# Patient Record
Sex: FEMALE | Race: WHITE | Employment: OTHER | URBAN - METROPOLITAN AREA
[De-identification: names, ages, dates, MRNs, and addresses within clinical notes are randomized per-mention and may not be internally consistent; named-entity substitution may affect disease eponyms.]

---

## 2018-05-24 ENCOUNTER — HOSPITAL ENCOUNTER (OUTPATIENT)
Dept: RADIATION ONCOLOGY | Age: 73
Discharge: HOME | End: 2018-05-24
Payer: MEDICARE

## 2018-05-31 ENCOUNTER — HOSPITAL ENCOUNTER (OUTPATIENT)
Dept: RADIATION ONCOLOGY | Age: 73
Discharge: HOME | End: 2018-05-31
Payer: MEDICARE

## 2018-05-31 VITALS
BODY MASS INDEX: 31.82 KG/M2 | RESPIRATION RATE: 14 BRPM | TEMPERATURE: 97.8 F | WEIGHT: 191 LBS | DIASTOLIC BLOOD PRESSURE: 68 MMHG | SYSTOLIC BLOOD PRESSURE: 122 MMHG | HEIGHT: 65 IN | HEART RATE: 80 BPM

## 2018-05-31 DIAGNOSIS — Z17.0 MALIGNANT NEOPLASM OF UPPER-OUTER QUADRANT OF RIGHT BREAST IN FEMALE, ESTROGEN RECEPTOR POSITIVE (HCC): Primary | ICD-10-CM

## 2018-05-31 DIAGNOSIS — C50.411 MALIGNANT NEOPLASM OF UPPER-OUTER QUADRANT OF RIGHT BREAST IN FEMALE, ESTROGEN RECEPTOR POSITIVE (HCC): Primary | ICD-10-CM

## 2018-05-31 PROCEDURE — 99204 OFFICE O/P NEW MOD 45 MIN: CPT | Performed by: RADIOLOGY

## 2018-05-31 PROCEDURE — 99205 OFFICE O/P NEW HI 60 MIN: CPT

## 2018-05-31 RX ORDER — NICOTINE 10 MG
CARTRIDGE (EA) INHALATION
COMMUNITY
Start: 2018-03-19 | End: 2018-11-21

## 2018-05-31 RX ORDER — OXYCODONE HYDROCHLORIDE 5 MG/1
TABLET ORAL
COMMUNITY
Start: 2018-05-10 | End: 2018-08-17

## 2018-06-01 ENCOUNTER — HOSPITAL ENCOUNTER (OUTPATIENT)
Dept: RADIATION ONCOLOGY | Age: 73
Discharge: HOME | End: 2018-06-01
Payer: MEDICARE

## 2018-06-04 ENCOUNTER — PRE-PROCEDURE TELEPHONE (OUTPATIENT)
Dept: RADIATION ONCOLOGY | Age: 73
End: 2018-06-04

## 2018-06-07 PROCEDURE — 77290 THER RAD SIMULAJ FIELD CPLX: CPT

## 2018-06-07 PROCEDURE — 77334 RADIATION TREATMENT AID(S): CPT

## 2018-06-11 PROCEDURE — 77295 3-D RADIOTHERAPY PLAN: CPT

## 2018-06-11 PROCEDURE — 77334 RADIATION TREATMENT AID(S): CPT

## 2018-06-11 PROCEDURE — 77300 RADIATION THERAPY DOSE PLAN: CPT

## 2018-06-14 ENCOUNTER — HOSPITAL ENCOUNTER (OUTPATIENT)
Dept: RADIATION ONCOLOGY | Age: 73
Discharge: HOME | End: 2018-06-14
Payer: MEDICARE

## 2018-06-14 VITALS
BODY MASS INDEX: 31.53 KG/M2 | DIASTOLIC BLOOD PRESSURE: 63 MMHG | TEMPERATURE: 97.7 F | HEART RATE: 76 BPM | WEIGHT: 189.5 LBS | SYSTOLIC BLOOD PRESSURE: 122 MMHG

## 2018-06-14 DIAGNOSIS — Z17.0 MALIGNANT NEOPLASM OF UPPER-OUTER QUADRANT OF RIGHT BREAST IN FEMALE, ESTROGEN RECEPTOR POSITIVE (HCC): Primary | ICD-10-CM

## 2018-06-14 DIAGNOSIS — C50.411 MALIGNANT NEOPLASM OF UPPER-OUTER QUADRANT OF RIGHT BREAST IN FEMALE, ESTROGEN RECEPTOR POSITIVE (HCC): Primary | ICD-10-CM

## 2018-06-14 PROCEDURE — 77412 RADIATION TX DELIVERY LVL 3: CPT

## 2018-06-14 PROCEDURE — 77417 THER RADIOLOGY PORT IMAGE(S): CPT

## 2018-06-14 PROCEDURE — 99999 PR OFFICE/OUTPT VISIT,PROCEDURE ONLY: CPT | Performed by: RADIOLOGY

## 2018-06-15 PROCEDURE — 77412 RADIATION TX DELIVERY LVL 3: CPT

## 2018-06-18 ENCOUNTER — TELEPHONE (OUTPATIENT)
Dept: RADIATION ONCOLOGY | Age: 73
End: 2018-06-18

## 2018-06-18 PROCEDURE — 77412 RADIATION TX DELIVERY LVL 3: CPT

## 2018-06-19 PROCEDURE — 77412 RADIATION TX DELIVERY LVL 3: CPT

## 2018-06-20 PROCEDURE — 77412 RADIATION TX DELIVERY LVL 3: CPT

## 2018-06-20 PROCEDURE — 77336 RADIATION PHYSICS CONSULT: CPT

## 2018-06-21 ENCOUNTER — HOSPITAL ENCOUNTER (OUTPATIENT)
Dept: RADIATION ONCOLOGY | Age: 73
Discharge: HOME | End: 2018-06-21
Payer: MEDICARE

## 2018-06-21 VITALS — BODY MASS INDEX: 31.45 KG/M2 | WEIGHT: 189 LBS

## 2018-06-21 DIAGNOSIS — Z17.0 MALIGNANT NEOPLASM OF UPPER-OUTER QUADRANT OF RIGHT BREAST IN FEMALE, ESTROGEN RECEPTOR POSITIVE (HCC): Primary | ICD-10-CM

## 2018-06-21 DIAGNOSIS — C50.411 MALIGNANT NEOPLASM OF UPPER-OUTER QUADRANT OF RIGHT BREAST IN FEMALE, ESTROGEN RECEPTOR POSITIVE (HCC): Primary | ICD-10-CM

## 2018-06-21 PROCEDURE — 77412 RADIATION TX DELIVERY LVL 3: CPT

## 2018-06-21 PROCEDURE — 77417 THER RADIOLOGY PORT IMAGE(S): CPT

## 2018-06-21 PROCEDURE — 99999 PR OFFICE/OUTPT VISIT,PROCEDURE ONLY: CPT | Performed by: RADIOLOGY

## 2018-06-22 PROCEDURE — 77412 RADIATION TX DELIVERY LVL 3: CPT

## 2018-06-25 PROCEDURE — 77412 RADIATION TX DELIVERY LVL 3: CPT

## 2018-06-26 PROCEDURE — 77412 RADIATION TX DELIVERY LVL 3: CPT

## 2018-06-27 PROCEDURE — 77412 RADIATION TX DELIVERY LVL 3: CPT

## 2018-06-27 PROCEDURE — 77336 RADIATION PHYSICS CONSULT: CPT

## 2018-06-28 ENCOUNTER — HOSPITAL ENCOUNTER (OUTPATIENT)
Dept: RADIATION ONCOLOGY | Age: 73
Discharge: HOME | End: 2018-06-28
Payer: MEDICARE

## 2018-06-28 VITALS
SYSTOLIC BLOOD PRESSURE: 117 MMHG | HEART RATE: 84 BPM | WEIGHT: 189.2 LBS | BODY MASS INDEX: 31.48 KG/M2 | DIASTOLIC BLOOD PRESSURE: 55 MMHG

## 2018-06-28 DIAGNOSIS — C50.411 MALIGNANT NEOPLASM OF UPPER-OUTER QUADRANT OF RIGHT BREAST IN FEMALE, ESTROGEN RECEPTOR POSITIVE (HCC): Primary | ICD-10-CM

## 2018-06-28 DIAGNOSIS — Z17.0 MALIGNANT NEOPLASM OF UPPER-OUTER QUADRANT OF RIGHT BREAST IN FEMALE, ESTROGEN RECEPTOR POSITIVE (HCC): Primary | ICD-10-CM

## 2018-06-28 PROCEDURE — 77417 THER RADIOLOGY PORT IMAGE(S): CPT

## 2018-06-28 PROCEDURE — 99999 PR OFFICE/OUTPT VISIT,PROCEDURE ONLY: CPT | Performed by: RADIOLOGY

## 2018-06-28 PROCEDURE — 77412 RADIATION TX DELIVERY LVL 3: CPT

## 2018-06-28 RX ORDER — ANASTROZOLE 1 MG/1
TABLET ORAL
COMMUNITY
Start: 2018-06-21

## 2018-06-29 PROCEDURE — 77412 RADIATION TX DELIVERY LVL 3: CPT

## 2018-07-02 ENCOUNTER — HOSPITAL ENCOUNTER (OUTPATIENT)
Dept: RADIATION ONCOLOGY | Age: 73
Discharge: HOME | End: 2018-07-02
Payer: MEDICARE

## 2018-07-02 VITALS
BODY MASS INDEX: 31.38 KG/M2 | SYSTOLIC BLOOD PRESSURE: 108 MMHG | DIASTOLIC BLOOD PRESSURE: 82 MMHG | HEART RATE: 91 BPM | WEIGHT: 188.6 LBS

## 2018-07-02 DIAGNOSIS — C50.411 MALIGNANT NEOPLASM OF UPPER-OUTER QUADRANT OF RIGHT BREAST IN FEMALE, ESTROGEN RECEPTOR POSITIVE (HCC): Primary | ICD-10-CM

## 2018-07-02 DIAGNOSIS — Z17.0 MALIGNANT NEOPLASM OF UPPER-OUTER QUADRANT OF RIGHT BREAST IN FEMALE, ESTROGEN RECEPTOR POSITIVE (HCC): Primary | ICD-10-CM

## 2018-07-02 PROCEDURE — 99999 PR OFFICE/OUTPT VISIT,PROCEDURE ONLY: CPT | Performed by: RADIOLOGY

## 2018-07-02 PROCEDURE — 77412 RADIATION TX DELIVERY LVL 3: CPT

## 2018-07-02 NOTE — PROGRESS NOTES
DEPARTMENT OF RADIATION ONCOLOGY   ON TREATMENT VISIT       7/2/2018      NAME:  Issac Rossi    YOB: 1945      Diagnosis:  1. Malignant neoplasm of upper-outer quadrant of right breast in female, estrogen receptor positive ([address]        SUBJECTIVE:   Kathi Edgar status post  3458 cGy to the right breast.  She is having a little soreness in the axillary area, despite Aquaphor         Physical Examination: Grade 1 reaction right axillary area. No edema or infection      Wt Readings from Last 3 Encounters:   07/02/18 188 lb 9.6 oz (85.5 kg)   06/28/18 189 lb 3.2 oz (85.8 kg)   06/21/18 189 lb (85.7 kg)       Labs:  Lab Results   Component Value Date    WBC 11.6 05/30/2014    RBC 5.30 05/30/2014    HGB 15.3 05/30/2014    HCT 46.8 05/30/2014    MCV 88.3 05/30/2014    MCH 28.9 05/30/2014    MCHC 32.8 05/30/2014    RDW 14.4 05/30/2014     05/30/2014    MPV 9.1 05/30/2014            ASSESSMENT/PLAN: Reassured. She can apply OTC hydrocortisone plus aloe    Continue treatment as planned      Elsa Higuera M.D.   Radiation Oncologist  [place]: [phone]   Daphne Clemons: [phone]: [phone]   Daphne Clemons: [phone])

## 2018-07-03 PROCEDURE — 77412 RADIATION TX DELIVERY LVL 3: CPT

## 2018-07-05 PROCEDURE — 77412 RADIATION TX DELIVERY LVL 3: CPT

## 2018-07-05 PROCEDURE — 77336 RADIATION PHYSICS CONSULT: CPT

## 2018-07-06 PROCEDURE — 77412 RADIATION TX DELIVERY LVL 3: CPT

## 2018-07-06 NOTE — PROGRESS NOTES
Cliff Dempsey  7/6/2018  8:32 AM          Current Outpatient Prescriptions   Medication Sig Dispense Refill    anastrozole (ARIMIDEX) 1 MG tablet       metFORMIN (GLUCOPHAGE) 500 MG tablet       NICOTROL 10 MG inhaler       oxyCODONE (ROXICODONE) 5 MG immediate release tablet       sitaGLIPtan (JANUVIA) 100 MG tablet Take 1 tablet by mouth daily. 30 tablet 2    atorvastatin (LIPITOR) 40 MG tablet Take 40 mg by mouth daily.  Omega-3 Fatty Acids (FISH OIL BURP-LESS) 1000 MG CAPS Take 1,000 mg by mouth. Once daily      LANCETS by Does not apply route. Indications: tests 3x a aday      glucose blood VI test strips (ASCENSIA AUTODISC VI;ONE TOUCH ULTRA TEST VI) strip Apply  topically as needed. As needed. Indications: use 3 a day      spironolactone (ALDACTONE) 100 MG tablet Take 2 pills daily 180 tablet 0    glimepiride (AMARYL) 2 MG tablet Take 2 mg by mouth daily (before lunch). Takes at lunch      Biotin 5000 MCG TABS Take 5,000 mg by mouth daily.  Multiple Vitamins-Minerals (CENTRUM SILVER) TABS Take  by mouth daily.  Cholecalciferol (VITAMIN D) 2000 UNITS CAPS capsule Take 2,000 Units by mouth daily.  Minoxidil (ROGAINE EX) Apply  topically 2 times daily. No current facility-administered medications for this encounter. This is an up-to-date medication list.    Please take this list to your next care provider, and discard any previous medication lists.

## 2018-08-17 ENCOUNTER — HOSPITAL ENCOUNTER (OUTPATIENT)
Dept: RADIATION ONCOLOGY | Age: 73
Discharge: HOME | End: 2018-08-17
Payer: MEDICARE

## 2018-08-17 VITALS
BODY MASS INDEX: 31.45 KG/M2 | DIASTOLIC BLOOD PRESSURE: 59 MMHG | SYSTOLIC BLOOD PRESSURE: 105 MMHG | HEART RATE: 86 BPM | TEMPERATURE: 97.9 F | WEIGHT: 189 LBS

## 2018-08-17 DIAGNOSIS — C50.411 MALIGNANT NEOPLASM OF UPPER-OUTER QUADRANT OF RIGHT BREAST IN FEMALE, ESTROGEN RECEPTOR POSITIVE (HCC): Primary | ICD-10-CM

## 2018-08-17 DIAGNOSIS — Z17.0 MALIGNANT NEOPLASM OF UPPER-OUTER QUADRANT OF RIGHT BREAST IN FEMALE, ESTROGEN RECEPTOR POSITIVE (HCC): Primary | ICD-10-CM

## 2018-08-17 PROCEDURE — 99999 PR OFFICE/OUTPT VISIT,PROCEDURE ONLY: CPT | Performed by: NURSE PRACTITIONER

## 2018-08-17 NOTE — PROGRESS NOTES
coordinating/giving care. >50% of the visit was spent in counseling the pt on the following: Follow up care    The nurses notes were reviewed and incorporated into this assessment and plan. Questions answered to apparent satisfaction.       Kika Julien, MSN, RN, APRN-CNP  Certified Nurse Practitioner for [address] [place] [address]  P: [phone]/ F: [phone]

## 2018-11-21 ENCOUNTER — HOSPITAL ENCOUNTER (OUTPATIENT)
Dept: RADIATION ONCOLOGY | Age: 73
Discharge: HOME | End: 2018-11-21
Payer: MEDICARE

## 2018-11-21 VITALS
SYSTOLIC BLOOD PRESSURE: 118 MMHG | TEMPERATURE: 97.7 F | BODY MASS INDEX: 31.12 KG/M2 | DIASTOLIC BLOOD PRESSURE: 70 MMHG | WEIGHT: 187 LBS | HEART RATE: 78 BPM

## 2018-11-21 DIAGNOSIS — C50.411 MALIGNANT NEOPLASM OF UPPER-OUTER QUADRANT OF RIGHT BREAST IN FEMALE, ESTROGEN RECEPTOR POSITIVE (HCC): Primary | ICD-10-CM

## 2018-11-21 DIAGNOSIS — Z92.3 S/P RADIATION THERAPY > 12 WKS AGO: ICD-10-CM

## 2018-11-21 DIAGNOSIS — Z17.0 MALIGNANT NEOPLASM OF UPPER-OUTER QUADRANT OF RIGHT BREAST IN FEMALE, ESTROGEN RECEPTOR POSITIVE (HCC): Primary | ICD-10-CM

## 2018-11-21 PROCEDURE — 99212 OFFICE O/P EST SF 10 MIN: CPT

## 2018-11-21 PROCEDURE — 99213 OFFICE O/P EST LOW 20 MIN: CPT | Performed by: NURSE PRACTITIONER

## 2018-11-21 RX ORDER — NICOTINE 21 MG/24HR
1 PATCH, TRANSDERMAL 24 HOURS TRANSDERMAL EVERY 24 HOURS
COMMUNITY
Start: 2018-11-18 | End: 2020-07-13 | Stop reason: ALTCHOICE

## 2018-11-21 RX ORDER — CHLORAL HYDRATE 500 MG
3000 CAPSULE ORAL 3 TIMES DAILY
COMMUNITY
End: 2020-07-13

## 2018-11-21 NOTE — PROGRESS NOTES
stretcher/bed with siderails up except when performing patient care activities  7. Educate patient/family/caregiver on falls prevention  8. Falls risk precaution (Yellow sticker Level III) placed on patient chart       NUTRITION RISK SCREEN    11/21/2018   Patient:  Elzbieta Lock  Sex:  female    NUTRITION RISK SCREEN  Instructions:  Assess the patient and enter the appropriate indicators that are present for nutrition risk identification. Total the numbers entered and assign a risk score. Follow the appropriate action for total score listed below. Assessment   Date  11/21/2018     1. Poor appetite?--a. One week or greater (1)                       b. One month or greater (2) 0        2. Unplanned wt loss?--a. Greater than 5# in 1 week(1)                                  b. Greater than 10# in 1 month (2)                                  c. Greater than 20# in 6 mos (1) 0        3. Diagnosis of Head or Neck Cancer? (2)   0    4. If yes, difficulty swallowing? (1) 0        5. Receiving nutrition via feeding tube or parenteral nutrition? (1) 0        6. If yes, report of problems with feeding tube? (1) 0      TOTAL 0         Score of 0-1: No action  Score 2 or greater:  1. For Non-Diabetic Patient: Recommend adding Ensure Complete 2xdaily and provide              patient with Ensure wellness bag with coupons; For Diabetic Patient, Recommend adding Glucerna Shake 2xdaily and provide patient with Glucerna Wellness bag with coupons  2.  Route to the dietitian via [employer]

## 2019-05-20 ENCOUNTER — HOSPITAL ENCOUNTER (OUTPATIENT)
Dept: RADIATION ONCOLOGY | Age: 74
Discharge: HOME | End: 2019-05-20
Payer: MEDICARE

## 2019-05-20 VITALS
RESPIRATION RATE: 16 BRPM | SYSTOLIC BLOOD PRESSURE: 128 MMHG | TEMPERATURE: 97.8 F | DIASTOLIC BLOOD PRESSURE: 78 MMHG | WEIGHT: 180 LBS | HEART RATE: 84 BPM | BODY MASS INDEX: 29.95 KG/M2

## 2019-05-20 DIAGNOSIS — Z17.0 MALIGNANT NEOPLASM OF UPPER-OUTER QUADRANT OF RIGHT BREAST IN FEMALE, ESTROGEN RECEPTOR POSITIVE (HCC): Primary | ICD-10-CM

## 2019-05-20 DIAGNOSIS — C50.411 MALIGNANT NEOPLASM OF UPPER-OUTER QUADRANT OF RIGHT BREAST IN FEMALE, ESTROGEN RECEPTOR POSITIVE (HCC): Primary | ICD-10-CM

## 2019-05-20 PROCEDURE — 99212 OFFICE O/P EST SF 10 MIN: CPT | Performed by: RADIOLOGY

## 2019-05-20 PROCEDURE — 99212 OFFICE O/P EST SF 10 MIN: CPT

## 2019-05-20 RX ORDER — MONTELUKAST SODIUM 4 MG/1
1 TABLET, CHEWABLE ORAL 2 TIMES DAILY
COMMUNITY

## 2019-05-20 NOTE — PROGRESS NOTES
Kathi Edgar  5/20/2019  8:57 AM      Vitals:    05/20/19 0851   BP: 128/78   Pulse: 84   Resp: 16   Temp: 97.8 °F (36.6 °C)    : Wt Readings from Last 3 Encounters:   05/20/19 180 lb (81.6 kg)   11/21/18 187 lb (84.8 kg)   08/17/18 189 lb (85.7 kg)                Current Outpatient Medications:     colestipol (COLESTID) 1 g tablet, Take 1 g by mouth 2 times daily, Disp: , Rfl:     Omega-3 Fatty Acids (FISH OIL) 1000 MG CAPS, Take 3,000 mg by mouth 3 times daily, Disp: , Rfl:     nicotine (NICODERM CQ) 21 MG/24HR, Place 1 patch onto the skin every 24 hours, Disp: , Rfl:     anastrozole (ARIMIDEX) 1 MG tablet, , Disp: , Rfl:     metFORMIN (GLUCOPHAGE) 500 MG tablet, , Disp: , Rfl:     atorvastatin (LIPITOR) 40 MG tablet, Take 40 mg by mouth daily. , Disp: , Rfl:     LANCETS, by Does not apply route. Indications: tests 3x a aday, Disp: , Rfl:     glucose blood VI test strips (ASCENSIA AUTODISC VI;ONE TOUCH ULTRA TEST VI) strip, Apply  topically as needed. As needed. Indications: use 3 a day, Disp: , Rfl:     spironolactone (ALDACTONE) 100 MG tablet, Take 2 pills daily, Disp: 180 tablet, Rfl: 0    glimepiride (AMARYL) 2 MG tablet, Take 2 mg by mouth daily (before lunch). Takes at lunch, Disp: , Rfl:     Biotin 5000 MCG TABS, Take 5,000 mg by mouth daily. , Disp: , Rfl:     Multiple Vitamins-Minerals (CENTRUM SILVER) TABS, Take  by mouth daily. , Disp: , Rfl:     Cholecalciferol (VITAMIN D) 2000 UNITS CAPS capsule, Take 2,000 Units by mouth daily. , Disp: , Rfl:       Patient is seen today in follow up for breast followup  . Kelly Toscano is a pleasant 74 y.o. female with a diagnosis of right breast cancer. Status post biopsy revealed invasive ductal carcinoma. Status post right lumpectomy and sentinel lymph node biopsy 05/2018. Negative margins, negative LNs. ER positive, [place] positive; HER2 negative. Oncotype Dx recurrence score 6.    The patient underwent radiation therapy to the right breast, coupons  · Route to the dietitian via [address]

## 2019-05-20 NOTE — PROGRESS NOTES
DEPARTMENT OF RADIATION ONCOLOGY   Follow up visit        2019      NAME:  Sridhar Santana    :  1945 74 y.o. female       Aris Rodriguez MD    Referring Physicians: Dr. Pillo Obrien        Diagnosis:  1. Malignant neoplasm of upper-outer quadrant of right breast in female, estrogen receptor positive ([facility] [address]        Narrative:  Sridhar Santana returns for a post radiation treatment follow visit. Completed a course of adjuvant XRT to the right breast. Treatment was started on 18 and completed a 18. The whole breast received a hypofractionated dose of 4256 cGy/16 fractions    She is doing fine with respect to her treated breast.  No symptoms. Recent bilateral mammogram showed a possible suspicion in the left breast.  However, the report  She got do was negative. .  She is tolerating anastrozole well          Past medical, surgical, social and family histories reviewed and updated as indicated. .    ALLERGIES:  Aspirin         Current Outpatient Medications:     colestipol (COLESTID) 1 g tablet, Take 1 g by mouth 2 times daily, Disp: , Rfl:     Omega-3 Fatty Acids (FISH OIL) 1000 MG CAPS, Take 3,000 mg by mouth 3 times daily, Disp: , Rfl:     nicotine (NICODERM CQ) 21 MG/24HR, Place 1 patch onto the skin every 24 hours, Disp: , Rfl:     anastrozole (ARIMIDEX) 1 MG tablet, , Disp: , Rfl:     metFORMIN (GLUCOPHAGE) 500 MG tablet, , Disp: , Rfl:     atorvastatin (LIPITOR) 40 MG tablet, Take 40 mg by mouth daily. , Disp: , Rfl:     LANCETS, by Does not apply route. Indications: tests 3x a aday, Disp: , Rfl:     glucose blood VI test strips (ASCENSIA AUTODISC VI;ONE TOUCH ULTRA TEST VI) strip, Apply  topically as needed. As needed. Indications: use 3 a day, Disp: , Rfl:     spironolactone (ALDACTONE) 100 MG tablet, Take 2 pills daily, Disp: 180 tablet, Rfl: 0    glimepiride (AMARYL) 2 MG tablet, Take 2 mg by mouth daily (before lunch).  Takes at lunch, Disp: , Rfl:     Biotin 5000 MCG TABS, Take 5,000 mg by mouth daily. , Disp: , Rfl:     Multiple Vitamins-Minerals (CENTRUM SILVER) TABS, Take  by mouth daily. , Disp: , Rfl:     Cholecalciferol (VITAMIN D) 2000 UNITS CAPS capsule, Take 2,000 Units by mouth daily. , Disp: , Rfl:     Physical Examination: Pleasant and conversant. Wt Readings from Last 3 Encounters:   05/20/19 180 lb (81.6 kg)   11/21/18 187 lb (84.8 kg)   08/17/18 189 lb (85.7 kg)     Looks healthy. Lymph nodes: No palpable neck or axillary lymphadenopathy. Breast exam: Right breast with only minimal fibrosis. No tenderness no suspicious lumps. Left breast: Normal to palpation. Lungs: Clear bilaterally. CVS: S1-S2 regular    Abdomen: Soft nontender      Labs:  Lab Results   Component Value Date    WBC 11.6 05/30/2014    RBC 5.30 05/30/2014    HGB 15.3 05/30/2014    HCT 46.8 05/30/2014    MCV 88.3 05/30/2014    MCH 28.9 05/30/2014    MCHC 32.8 05/30/2014    RDW 14.4 05/30/2014     05/30/2014    MPV 9.1 05/30/2014         ASSESSMENT/PLAN:  Ayesha Milton is doing well overall. Next FU 1 year and after that as needed. She'll contact the radiology department at [facility] regarding the report of the mammogram  I asked Ayesha Milton  to contact us at any time for any questions or concerns. Thank you for allowing us to participate in your patient management and care. Blanco M.D.   Radiation Oncologist  [address]:  [address]:  [phone]

## 2020-07-09 ENCOUNTER — TELEPHONE (OUTPATIENT)
Dept: RADIATION ONCOLOGY | Age: 75
End: 2020-07-09

## 2020-07-09 NOTE — TELEPHONE ENCOUNTER
1 Georgette Posey 7/7/20 spoke to Roseanna Mcclure, for recent progress notes and mammogram results, and states patients next appointment with Dr. Medina Ta is 7/16/20, and mammogram results not available. I also called [facility] and today I faxed a request for a copy of mammogram results, confirmation received.

## 2020-07-13 ENCOUNTER — HOSPITAL ENCOUNTER (OUTPATIENT)
Dept: RADIATION ONCOLOGY | Age: 75
Discharge: HOME | End: 2020-07-13
Payer: MEDICARE

## 2020-07-13 VITALS
HEART RATE: 87 BPM | DIASTOLIC BLOOD PRESSURE: 64 MMHG | SYSTOLIC BLOOD PRESSURE: 106 MMHG | BODY MASS INDEX: 29.33 KG/M2 | OXYGEN SATURATION: 94 % | RESPIRATION RATE: 20 BRPM | TEMPERATURE: 97.2 F | WEIGHT: 176.25 LBS

## 2020-07-13 PROBLEM — Z17.0 MALIGNANT NEOPLASM OF UPPER-OUTER QUADRANT OF RIGHT BREAST IN FEMALE, ESTROGEN RECEPTOR POSITIVE (HCC): Status: ACTIVE | Noted: 2020-07-13

## 2020-07-13 PROBLEM — C50.411 MALIGNANT NEOPLASM OF UPPER-OUTER QUADRANT OF RIGHT BREAST IN FEMALE, ESTROGEN RECEPTOR POSITIVE (HCC): Status: ACTIVE | Noted: 2020-07-13

## 2020-07-13 PROCEDURE — 99212 OFFICE O/P EST SF 10 MIN: CPT | Performed by: RADIOLOGY

## 2020-07-13 PROCEDURE — 99212 OFFICE O/P EST SF 10 MIN: CPT

## 2020-07-13 NOTE — PROGRESS NOTES
Kathi Edgar  7/13/2020  10:12 AM      Vitals:    07/13/20 1003   BP: 106/64   Pulse: 87   Resp: 20   Temp: 97.2 °F (36.2 °C)   SpO2: 94%    : Wt Readings from Last 3 Encounters:   07/13/20 176 lb 4 oz (79.9 kg)   05/20/19 180 lb (81.6 kg)   11/21/18 187 lb (84.8 kg)                Current Outpatient Medications:     colestipol (COLESTID) 1 g tablet, Take 1 g by mouth 2 times daily, Disp: , Rfl:     anastrozole (ARIMIDEX) 1 MG tablet, , Disp: , Rfl:     metFORMIN (GLUCOPHAGE) 500 MG tablet, , Disp: , Rfl:     atorvastatin (LIPITOR) 40 MG tablet, Take 40 mg by mouth daily. , Disp: , Rfl:     LANCETS, by Does not apply route. Indications: tests 3x a aday, Disp: , Rfl:     glucose blood VI test strips (ASCENSIA AUTODISC VI;ONE TOUCH ULTRA TEST VI) strip, Apply  topically as needed. As needed. Indications: use 3 a day, Disp: , Rfl:     spironolactone (ALDACTONE) 100 MG tablet, Take 2 pills daily, Disp: 180 tablet, Rfl: 0    glimepiride (AMARYL) 2 MG tablet, Take 2 mg by mouth daily (before lunch). Takes at lunch, Disp: , Rfl:     Multiple Vitamins-Minerals (CENTRUM SILVER) TABS, Take  by mouth daily. , Disp: , Rfl:     Cholecalciferol (VITAMIN D) 2000 UNITS CAPS capsule, Take 2,000 Units by mouth daily. , Disp: , Rfl:       Patient is seen today in follow up for Right breast        FALLS RISK SCREENING ASSESSMENT    Instructions:  Assess the patient and enter the appropriate indicators that are present for fall risk identification. Total the numbers entered and assign a fall risk score from Table 2.  Reassess patient at a minimum every 12 weeks or with status change. Assessment   Date  7/13/2020     1. Mental Ability: confusion/cognitively impaired No - 0       2. Elimination Issues: incontinence, frequency Yes - 3/  Frequency all the time       3. Ambulatory: use of assistive devices (walker, cane, off-loading devices), attached to equipment (IV pole, oxygen) No - 0     4.   Sensory Limitations: dizziness, vertigo, impaired vision No - 0       5. Age 72 years or greater - 1       10. Medication: diuretics, strong analgesics, hypnotics, sedatives, antihypertensive agents   Yes - 3   7. Falls:  recent history of falls within the last 3 months (not to include slipping or tripping)   No - 0   TOTAL 7    If score of 4 or greater was education given? Yes       TABLE 2   Risk Score Risk Level Plan of Care   0-3 Little or  No Risk 1. Provide assistance as indicated for ambulation activities  2. Reorient confused/cognitively impaired patient  3. Call-light/bell within patient's reach  4. Chair/bed in low position, stretcher/bed with siderails up except when performing patient care activities  5. Educate patient/family/caregiver on falls prevention  6.  Reassess in 12 weeks or with any noted change in patient condition which places them at a risk for a fall   4-6 Moderate Risk 1. Provide assistance as indicated for ambulation activities  2. Reorient confused/cognitively impaired patient  3. Call-light/bell within patient's reach  4. Chair/bed in low position, stretcher/bed with siderails up except when performing patient care activities  5. Educate patient/family/caregiver on falls prevention  6. Falls risk precaution (Yellow sticker Level II) placed on patient chart   7 or   Higher High Risk 1. Place patient in easily observable treatment room  2. Patient attended at all times by family member or staff  3. Provide assistance as indicated for ambulation activities  4. Reorient confused/cognitively impaired patient  5. Call-light/bell within patient's reach  6. Chair/bed in low position, stretcher/bed with siderails up except when performing patient care activities  7. Educate patient/family/caregiver on falls prevention  8.   Falls risk precaution (Yellow sticker Level III) placed on patient chart           MALNUTRITION RISK SCREENING ASSESSMENT    7/13/2020   Patient:  Osker Blizzard Mike Situ  Sex:  female    Instructions:  Assess the patient and enter the appropriate indicators that are present for nutrition risk identification. Total the numbers entered and assign a risk score. Follow the appropriate action for total score listed below. Assessment   Date  7/13/2020     1. Have you lost weight without trying? 0- No     2. Have you been eating poorly because of a decreased appetite? 0- No   3. Do you have a diagnosis of head and neck cancer? 0- No                                                                                    TOTAL 0          Score of 0-1: No action  Score 2 or greater:  · For Non-Diabetic Patient: Recommend adding Ensure Complete 2 x daily and provide patient with Ensure wellness bag with coupons  · For Diabetic Patient: Recommend adding Glucerna Shake 2 x daily and provide patient with Glucerna Wellness bag with coupons  · Route to the dietitian via [address] [facility] is a pleasant 66years old female, alert and oriented x 4, ambulatory witout any assistance. She is here today for a 1 year follow up after completed RT to right breast 16 fractions, 4256 cGy from 6/14/18 to 7/618. She is doing well, no complaints today. She had her mammogram 7/8/20, results not in [facility] yet but she showed me the letter that she received which states mammogram is normal.  She follows with Dr. Islas Matters oncology at [facility], her next appointment is 7/16/20. She has no other complaints at this time.

## 2020-07-13 NOTE — PROGRESS NOTES
or axillary lymphadenopathy. Breast exam: Right breast shows no late radiation effects. No tenderness no suspicious lumps. Left breast normal to palpation. Lungs: Clear bilaterally    CVS: S1-S2 regular      Labs:  Lab Results   Component Value Date    WBC 11.6 05/30/2014    RBC 5.30 05/30/2014    HGB 15.3 05/30/2014    HCT 46.8 05/30/2014    MCV 88.3 05/30/2014    MCH 28.9 05/30/2014    MCHC 32.8 05/30/2014    RDW 14.4 05/30/2014     05/30/2014    MPV 9.1 05/30/2014         ASSESSMENT/PLAN:  Dre Wells is doing well overall. I expect a good prognosis. As she follows closely with her medical oncologist, and is 2 years out of radiation therapy, no regular follow-up with me unless she has questions      I asked Dre Wells  to contact us at any time for any questions or concerns. Thank you for allowing us to participate in your patient management and care. Clay M.D.   Radiation Oncologist  [facility] [address]:  [address]:  [phone]

## 2025-01-06 ENCOUNTER — TRANSCRIBE ORDERS (OUTPATIENT)
Dept: ADMINISTRATIVE | Age: 80
End: 2025-01-06

## 2025-01-06 DIAGNOSIS — I73.9 PERIPHERAL VASCULAR DISEASE, UNSPECIFIED (HCC): Primary | ICD-10-CM

## 2025-01-06 DIAGNOSIS — Z72.0 TOBACCO USE: ICD-10-CM

## 2025-02-11 ENCOUNTER — HOSPITAL ENCOUNTER (OUTPATIENT)
Dept: ULTRASOUND IMAGING | Age: 80
Discharge: HOME | End: 2025-02-13
Attending: INTERNAL MEDICINE
Payer: MEDICARE

## 2025-02-11 DIAGNOSIS — Z72.0 TOBACCO USE: ICD-10-CM

## 2025-02-11 DIAGNOSIS — I73.9 PERIPHERAL VASCULAR DISEASE, UNSPECIFIED (HCC): ICD-10-CM

## 2025-02-11 PROCEDURE — 93925 LOWER EXTREMITY STUDY: CPT

## 2025-02-12 ENCOUNTER — HOSPITAL ENCOUNTER (OUTPATIENT)
Dept: INTERVENTIONAL RADIOLOGY/VASCULAR | Age: 80
Discharge: HOME | End: 2025-02-14
Attending: INTERNAL MEDICINE
Payer: MEDICARE

## 2025-02-12 DIAGNOSIS — I73.9 PERIPHERAL VASCULAR DISEASE, UNSPECIFIED (HCC): ICD-10-CM

## 2025-02-12 PROCEDURE — 93922 UPR/L XTREMITY ART 2 LEVELS: CPT | Performed by: SURGERY

## 2025-02-12 PROCEDURE — 93922 UPR/L XTREMITY ART 2 LEVELS: CPT
